# Patient Record
Sex: FEMALE | Race: WHITE | NOT HISPANIC OR LATINO | Employment: UNEMPLOYED | URBAN - METROPOLITAN AREA
[De-identification: names, ages, dates, MRNs, and addresses within clinical notes are randomized per-mention and may not be internally consistent; named-entity substitution may affect disease eponyms.]

---

## 2019-11-27 ENCOUNTER — APPOINTMENT (OUTPATIENT)
Dept: URGENT CARE | Facility: CLINIC | Age: 29
End: 2019-11-27

## 2020-07-01 ENCOUNTER — APPOINTMENT (OUTPATIENT)
Dept: RADIOLOGY | Facility: MEDICAL CENTER | Age: 30
End: 2020-07-01
Attending: EMERGENCY MEDICINE
Payer: COMMERCIAL

## 2020-07-01 ENCOUNTER — HOSPITAL ENCOUNTER (EMERGENCY)
Facility: MEDICAL CENTER | Age: 30
End: 2020-07-01
Attending: EMERGENCY MEDICINE
Payer: COMMERCIAL

## 2020-07-01 VITALS
OXYGEN SATURATION: 96 % | HEIGHT: 66 IN | TEMPERATURE: 98.2 F | SYSTOLIC BLOOD PRESSURE: 129 MMHG | BODY MASS INDEX: 22.96 KG/M2 | RESPIRATION RATE: 16 BRPM | DIASTOLIC BLOOD PRESSURE: 83 MMHG | HEART RATE: 83 BPM | WEIGHT: 142.86 LBS

## 2020-07-01 DIAGNOSIS — S60.222A CONTUSION OF LEFT HAND, INITIAL ENCOUNTER: ICD-10-CM

## 2020-07-01 PROCEDURE — 73130 X-RAY EXAM OF HAND: CPT | Mod: LT

## 2020-07-01 PROCEDURE — 99283 EMERGENCY DEPT VISIT LOW MDM: CPT

## 2020-07-01 RX ORDER — CEPHALEXIN 500 MG/1
500 CAPSULE ORAL 3 TIMES DAILY
Qty: 15 CAP | Refills: 0 | Status: SHIPPED | OUTPATIENT
Start: 2020-07-01 | End: 2020-07-06

## 2020-07-01 SDOH — HEALTH STABILITY: MENTAL HEALTH: HOW MANY STANDARD DRINKS CONTAINING ALCOHOL DO YOU HAVE ON A TYPICAL DAY?: 5 OR 6

## 2020-07-01 SDOH — HEALTH STABILITY: MENTAL HEALTH: HOW OFTEN DO YOU HAVE A DRINK CONTAINING ALCOHOL?: 4 OR MORE TIMES A WEEK

## 2020-07-01 NOTE — ED PROVIDER NOTES
"ED Provider Note    CHIEF COMPLAINT  Chief Complaint   Patient presents with   • Hand Injury     Yesterday at work  had her left hand slammed between the door jamb  as a dog jumped on the door.  Skin abrasion and hand is swollen and throbbing.       HPI  Moise Guillen is a 29 y.o. female who presents with a report that she injured her hand at work yesterday.  She is a  and apparently in the shuffle got her hand stuck between a door and the wall when it was closing.  She has pain at the left fourth and fifth metacarpals with 2 areas of abrasive wounds.  She had a tetanus shot 5 years ago.  She has not had any fever chills or sweats.  Denies any numbness, paresthesias or functional issues with the left hand which is nondominant for her.  Denies any other complaints and states this did happen at work    ROS  Pertinent negative for fever.    PAST MEDICAL HISTORY  Past Medical History:   Diagnosis Date   • Psychiatric disorder     anxiety       FAMILY HISTORY  History reviewed. No pertinent family history.    SOCIAL HISTORY   reports that she has been smoking cigarettes. She has a 7.00 pack-year smoking history. She has never used smokeless tobacco. She reports current alcohol use. She reports current drug use. Drug: Inhaled.    SURGICAL HISTORY  History reviewed. No pertinent surgical history.    CURRENT MEDICATIONS  Home Medications     Reviewed by Janey Glez R.N. (Registered Nurse) on 07/01/20 at 1418  Med List Status: Partial   Medication Last Dose Status        Patient Jeffrey Taking any Medications                       ALLERGIES  No Known Allergies    PHYSICAL EXAM  VITAL SIGNS: /90   Pulse (!) 107   Temp 36.8 °C (98.2 °F) (Temporal)   Resp 16   Ht 1.676 m (5' 6\")   Wt 64.8 kg (142 lb 13.7 oz)   LMP 06/22/2020 (Approximate)   SpO2 96%   BMI 23.06 kg/m²    Constitutional: Well developed, Well nourished, No acute distress, Non-toxic appearance.   Skin: 2 small 1 cm sized " abrasions without laceration are noted.  There is some surrounding erythema and warmth that is concerning.  No evidence of abscess.  There is swelling overlying the fourth and fifth metacarpals.    Extremities: With flexion and extension of all digits.  Swelling overlying the fourth and fifth metacarpals midshaft as described above  Musculoskeletal:   Neurologic: Neurologically intact  Psychiatric:     RADIOLOGY/PROCEDURES  DX-HAND 3+ LEFT   Final Result      Unremarkable wrist/hand series.            COURSE & MEDICAL DECISION MAKING  Pertinent Labs & Imaging studies reviewed. (See chart for details)  The patient has a work related injury to her left nondominant hand with some abrasions and given the environment and the surrounding erythema I am concerned about early infection.  I am placing her on a 5-day course of Keflex and have sent her prescription to pharmacy.    I did perform a 3 view x-ray of the left hand that shows no evidence of fracture.  I think she has a bone contusion of the fourth and fifth metacarpals.  She is encouraged about this finding and I am releasing her to full duty at work.  There is no indication for follow-up with Workmen's Comp. and she will return if any significant change in symptoms and I have completed her Workmen's Comp. paperwork    FINAL IMPRESSION  1. Contusion of left hand, initial encounter            Electronically signed by: Gustabo Matson M.D., 7/1/2020 2:36 PM    The note accurately reflects work and decisions made by me.  Gustabo Matson M.D.  7/1/2020  3:12 PM

## 2020-07-01 NOTE — ED NOTES
Pt was given discharge instructions and follow up care instructions  Pt was given information regarding medications to take at home and medication prescription  Pt had no further questions or concerns  Pt ambulated out of the ER without difficulty    Returned pt call let her know wilfrid with with blu on 02/20/2017 at 9:00 am

## 2020-07-01 NOTE — LETTER
"  FORM C-4:  EMPLOYEE’S CLAIM FOR COMPENSATION/ REPORT OF INITIAL TREATMENT  EMPLOYEE’S CLAIM - PROVIDE ALL INFORMATION REQUESTED   First Name Moise Last Name Wilmer Birthdate 1990  Sex female Claim Number   Home Employee Address [address]  [facility]                                     Zip  [address] Height  1.676 m (5' 6\") Weight  64.8 kg (142 lb 13.7 oz) [facility] [phone]  [id number]   Mailing Employee Address [address]   [facility]               Zip  [address] Telephone  [phone] (home)  Primary Language Spoken english   Insurer Third Party   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 (if applicable) working, walking, putting away dogs.    How did this injury or occupational disease occur? Be specific and answer in detail. Use additional sheet if necessary)  I was putting a dog away back in its room.  The dog jumped on the door when my hand was in frame...Smash   If you believe that you have an occupational disease, when did you first have knowledge of the disability and it relationship to your employment? t Witnesses to the Accident  no   Nature of Injury or Occupational Disease  Workers' Compensation Part(s) of Body Injured or Affected  Hand (L), N/A, N/A    I CERTIFY THAT THE ABOVE IS TRUE AND CORRECT TO THE BEST OF MY KNOWLEDGE AND THAT I HAVE PROVIDED THIS INFORMATION IN ORDER TO OBTAIN THE BENEFITS OF " [place]).  I HEREBY AUTHORIZE ANY PHYSICIAN, CHIROPRACTOR, SURGEON, PRACTITIONER, OR OTHER PERSON, ANY HOSPITAL, INCLUDING [facility] OR [facility], ANY MEDICAL SERVICE ORGANIZATION, ANY INSURANCE COMPANY, OR OTHER INSTITUTION OR ORGANIZATION TO RELEASE TO EACH OTHER, ANY MEDICAL OR OTHER INFORMATION, INCLUDING BENEFITS PAID OR PAYABLE, PERTINENT TO THIS INJURY OR DISEASE, EXCEPT INFORMATION RELATIVE TO DIAGNOSIS, TREATMENT AND/OR COUNSELING FOR AIDS, PSYCHOLOGICAL CONDITIONS, ALCOHOL OR CONTROLLED SUBSTANCES, FOR WHICH I MUST GIVE SPECIFIC AUTHORIZATION.  A PHOTOSTAT OF THIS AUTHORIZATION SHALL BE AS VALID AS THE ORIGINAL.  Date   07/01/2020                   Place         [facility]                   Employee’s Signature   THIS REPORT MUST BE COMPLETED AND MAILED WITHIN 3 WORKING DAYS OF TREATMENT   Place [facility], EMERGENCY DEPT                                                                             Name of Facility [facility]   Date  7/1/2020 Diagnosis  (S60.222A) Contusion of left hand, initial encounter Is there evidence the injured employee was under the influence of alcohol and/or another controlled substance at the time of accident?   Hour  3:12 PM Description of Injury or Disease  Contusion of left hand, initial encounter No   Treatment  OTC meds and Keflex 5 days  Have you advised the patient to remain off work five days or more?         No   X-Ray Findings  Negative If Yes   From Date    To Date      From information given by the employee, together with medical evidence, can you directly connect this injury or occupational disease as job incurred? Yes If No, is employee capable of: Full Duty  Yes Modified Duty      Is additional medical care by a physician indicated? No If Modified Duty, Specify any Limitations / Restrictions       Do you  "know of any previous injury or disease contributing to this condition or occupational disease? No    Date 7/1/2020 Print Doctor’s Name Celestine Matson I certify the employer’s copy of this form was mailed on:   Address [address] [place] [address] [phone] INSURER’S USE ONLY   Provider’s Tax ID Number   Telephone Dept: [phone]    Doctor’s Signature e-CELESTINE Mathur M.D. Degree  md      Form C-4 (rev.10/07)                                                                         BRIEF DESCRIPTION OF RIGHTS AND BENEFITS  (Pursuant to [place] 616[place].050)    Notice of Injury or Occupational Disease (Incident Report Form C-1): If an injury or occupational disease (OD) arises out of and in the course of employment, you must provide written notice to your employer as soon as practicable, but no later than 7 days after the accident or OD. Your employer shall maintain a sufficient supply of the required forms.    Claim for Compensation (Form C-4): If medical treatment is sought, the form C-4 is available at the place of initial treatment. A completed \"Claim for Compensation\" (Form C-4) must be filed within 90 days after an accident or OD. The treating physician or chiropractor must, within 3 working days after treatment, complete and mail to the employer, the employer's insurer and third-party , the Claim for Compensation.    Medical Treatment: If you require medical treatment for your on-the-job injury or OD, you may be required to select a physician or chiropractor from a list provided by your workers’ compensation insurer, if it has contracted with an Organization for Managed Care (MCO) or Preferred Provider Organization (PPO) or providers of health care. If your employer has not entered into a contract with an MCO or PPO, you may select a physician or chiropractor from the Panel of Physicians and Chiropractors. Any medical costs related to your industrial injury or OD will be paid by your " insurer.    Temporary Total Disability (TTD): If your doctor has certified that you are unable to work for a period of at least 5 consecutive days, or 5 cumulative days in a 20-day period, or places restrictions on you that your employer does not accommodate, you may be entitled to TTD compensation.    Temporary Partial Disability (TPD): If the wage you receive upon reemployment is less than the compensation for TTD to which you are entitled, the insurer may be required to pay you TPD compensation to make up the difference. TPD can only be paid for a maximum of 24 months.    Permanent Partial Disability (PPD): When your medical condition is stable and there is an indication of a PPD as a result of your injury or OD, within 30 days, your insurer must arrange for an evaluation by a rating physician or chiropractor to determine the degree of your PPD. The amount of your PPD award depends on the date of injury, the results of the PPD evaluation and your age and wage.    Permanent Total Disability (PTD): If you are medically certified by a treating physician or chiropractor as permanently and totally disabled and have been granted a PTD status by your insurer, you are entitled to receive monthly benefits not to exceed 66 2/3% of your average monthly wage. The amount of your PTD payments is subject to reduction if you previously received a PPD award.    Vocational Rehabilitation Services: You may be eligible for vocational rehabilitation services if you are unable to return to the job due to a permanent physical impairment or permanent restrictions as a result of your injury or occupational disease.    Transportation and Per Patricia Reimbursement: You may be eligible for travel expenses and per patricia associated with medical treatment.    Reopening: You may be able to reopen your claim if your condition worsens after claim closure.     Appeal Process: If you disagree with a written determination issued by the insurer or the  insurer does not respond to your request, you may appeal to the Department of Administration, , by following the instructions contained in your determination letter. You must appeal the determination within 70 days from the date of the determination letter at [address], [address], [place], [place] [address], or [address], [address], [place], [place] [address]. If you disagree with the  decision, you may appeal to the Department of Administration, . You must file your appeal within 30 days from the date of the  decision letter at [address], [address], [place], [place] [address], or [address], [address], [place], [place] [address]. If you disagree with a decision of an , you may file a petition for judicial review with the District Court. You must do so within 30 days of the Appeal Officer’s decision. You may be represented by an  at your own expense or you may contact the [facility] for possible representation.    [place]  for Injured Workers (NAIW): If you disagree with a  decision, you may request that NAIW represent you without charge at an  Hearing. For information regarding denial of benefits, you may contact the [facility] at: [address], [address], [place] [address], [phone], or [address], [place] [address], [phone]    To File a Complaint with the Division: If you wish to file a complaint with the  of the Division of Industrial Relations (DIR),  please contact the Workers’ Compensation Section, [address], [address], [place], [place] [address], telephone [phone] [phone], or [address], [place], [place] [address], telephone [phone].    For assistance with Workers’ Compensation Issues: You may contact the Office of the Governor Consumer Health Assistance, [address]  [facility], [place], [place] [address], Toll Free 1-159.514.2167, Web site: http://[email or web address].[email or web address]., E-mail [email or web address].  D-2 (rev. 06/18)              __________________________________________________________________                                    ___07/01/2020____            Employee Name / Signature                                                                                                                            Date

## 2021-11-23 ENCOUNTER — HOSPITAL ENCOUNTER (EMERGENCY)
Facility: MEDICAL CENTER | Age: 31
End: 2021-11-23
Attending: EMERGENCY MEDICINE

## 2021-11-23 VITALS
OXYGEN SATURATION: 97 % | TEMPERATURE: 97.6 F | WEIGHT: 127.21 LBS | SYSTOLIC BLOOD PRESSURE: 113 MMHG | HEIGHT: 66 IN | BODY MASS INDEX: 20.44 KG/M2 | RESPIRATION RATE: 18 BRPM | DIASTOLIC BLOOD PRESSURE: 69 MMHG | HEART RATE: 76 BPM

## 2021-11-23 DIAGNOSIS — H61.21 IMPACTED CERUMEN OF RIGHT EAR: ICD-10-CM

## 2021-11-23 PROCEDURE — 99282 EMERGENCY DEPT VISIT SF MDM: CPT

## 2021-11-23 NOTE — DISCHARGE INSTRUCTIONS
Use the drops we have prescribed for you.  In the meantime, for ear pain, use 600 mg of ibuprofen every 6 hours, as needed, with food or milk.  If your symptoms are not steadily improving over the next 3 to 5 days, use the contact information provided above to schedule a visit with an ear nose and throat specialist.

## 2021-11-23 NOTE — ED PROVIDER NOTES
"ED Provider Note    Scribed for Chilango Virgen M.D. by Kortney Reyes. 11/23/2021,  9:27 AM.    CHIEF COMPLAINT  Chief Complaint   Patient presents with    Ear Pain     R sided, x 5-6 days, \"it feels like it's full of fluid\".  Pt states she had a small amount of black come out on a qtip a couple days ago and thought it could be a gnat or dried blood.  Pt states she took some Amoxicillian every 12 hours x 4 pills from a friend.    Blepharitis     \"my eyelids were swollen this morning\"       HPI  Moise Guillen is a 31 y.o. female who presents to the Emergency Department for worsening right sided ear pain with an onset of 5-6 days ago. She describes her pain as pressure like in quality without any improvement. She attempted to clean her ear canal with a cotton swab and noticed there was a small amount of black material on her qtip. Patient believes this may be a gnat or dried blood. Patient also used hydrogen peroxide and took some ibuprofen this morning with mild improvement. Additionally, she has taken some Amoxicillin every 12 hours x4 pills from a friend without any relief. Exacerbating factors include bending forward. No alleviating factors were identified. She reports associated right sided headache. She denies any associated fever, or chills.    REVIEW OF SYSTEMS  See HPI for further details.     PAST MEDICAL HISTORY   has a past medical history of Psychiatric disorder.    SOCIAL HISTORY  Social History     Tobacco Use    Smoking status: Current Every Day Smoker     Packs/day: 1.00     Years: 7.00     Pack years: 7.00     Types: Cigarettes    Smokeless tobacco: Never Used    Tobacco comment: 0.5/ day   Substance and Sexual Activity    Alcohol use: Yes    Drug use: Yes     Types: Inhaled     Comment: occ canibus    Sexual activity: Never     Comment: work: [employer] LMP: 2 month ago.     Social History     Substance and Sexual Activity   Drug Use Yes    Types: Inhaled    Comment: occ canibus " "      SURGICAL HISTORY  patient denies any surgical history    CURRENT MEDICATIONS  Home Medications    **Home medications have not yet been reviewed for this encounter**       Current medications can be seen on the nurse's note.      ALLERGIES  No Known Allergies    PHYSICAL EXAM  VITAL SIGNS: /84   Pulse (!) 107   Temp 36.4 °C (97.5 °F) (Temporal)   Resp 16   Ht 1.676 m (5' 6\")   Wt 57.7 kg (127 lb 3.3 oz)   LMP 11/22/2021 (Exact Date)   SpO2 97%   BMI 20.53 kg/m²   Pulse ox interpretation: I interpret this pulse ox as normal.  Constitutional: Alert in no apparent distress.  HENT: No signs of trauma, Bilateral external ears normal, Nose normal. Right ear TM larger and obscured by light colored wax, no obvious otitis media of the visible portion of the TM. No otitis externa. no mastoid tenderness.   Eyes: Pupils are equal and reactive, Conjunctiva normal, Non-icteric.   Neck: Normal range of motion, Supple, No stridor.    Cardiovascular: Normal peripheral perfusion  Thorax & Lungs: Unlabored respirations, equal chest expansion, no accessory muscle use  Skin:  No erythema, No rash.   Extremities: No gross deformity  Musculoskeletal: Good range of motion in all major joints.   Neurologic: Alert, Normal motor function, No focal deficits noted.   Psychiatric: Affect normal, Judgment normal, Mood normal..    COURSE & MEDICAL DECISION MAKING  Nursing notes, VS PMSFHx reviewed in chart.     9:27 AM Patient seen and examined at bedside. Discussed plan of care. I advised her to stop using cotton swabs for her ear and taking antibiotics given to her by her friend as these can worsen or incompletely treat her symptoms. I will prescribe her ear drops to dissolve her ear wax. Irrigating her right TM may not be the correct treatment at this time due to significant pain already, likely made worse by non-emergent irrigation. The patient will return for new or worsening symptoms and is stable at the time of " discharge. Patient verbalizes understanding and agreement to this plan of care.      The patient is referred to a primary physician for blood pressure management, diabetic screening, and for all other preventative health concerns.    DISPOSITION:  Patient will be discharged home in stable condition.    FOLLOW UP:  Clotilde Duval M.D.  [address] [facility]  [place] [id number] [phone]      As needed, If symptoms worsen      OUTPATIENT MEDICATIONS:  New Prescriptions    CARBAMIDE PEROXIDE (DEBROX) 6.5 % SOLUTION    Administer 6 Drops into the right ear 2 times a day for 10 days. Administer drops in both ears.         FINAL IMPRESSION  1. Impacted cerumen of right ear         Kortney AGUSTIN (Scribe), am scribing for, and in the presence of, Chilango Virgen M.D..    Electronically signed by: Kortney Reyes (Scribe), 11/23/2021    IChilango M.D. personally performed the services described in this documentation, as scribed by Kortney Reyes in my presence, and it is both accurate and complete.    E    The note accurately reflects work and decisions made by me.  Chilango Virgen M.D.  11/23/2021  9:43 AM

## 2021-11-23 NOTE — ED TRIAGE NOTES
"Moise Guillen 31 y.o. female ambulatory to triage for     Chief Complaint   Patient presents with   • Ear Pain     R sided, x 5-6 days, \"it feels like it's full of fluid\".  Pt states she had a small amount of black come out on a qtip a couple days ago and thought it could be a gnat or dried blood.  Pt states she took some Amoxicillian every 12 hours x 4 pills from a friend.   • Blepharitis     \"my eyelids were swollen this morning\"     Pt denies recent fever or other symptoms.  /84   Pulse (!) 107   Temp (!) 35.8 °C (96.4 °F) (Temporal)   Resp 16   Ht 1.676 m (5' 6\")   Wt 57.7 kg (127 lb 3.3 oz)   LMP 11/22/2021 (Exact Date)   SpO2 97%   BMI 20.53 kg/m²     "